# Patient Record
Sex: MALE | Race: BLACK OR AFRICAN AMERICAN | NOT HISPANIC OR LATINO
[De-identification: names, ages, dates, MRNs, and addresses within clinical notes are randomized per-mention and may not be internally consistent; named-entity substitution may affect disease eponyms.]

---

## 2021-01-04 ENCOUNTER — TRANSCRIPTION ENCOUNTER (OUTPATIENT)
Age: 46
End: 2021-01-04

## 2022-11-01 ENCOUNTER — APPOINTMENT (OUTPATIENT)
Dept: HUMAN REPRODUCTION | Facility: CLINIC | Age: 47
End: 2022-11-01

## 2023-08-24 PROBLEM — Z00.00 ENCOUNTER FOR PREVENTIVE HEALTH EXAMINATION: Status: ACTIVE | Noted: 2023-08-24

## 2023-08-28 ENCOUNTER — APPOINTMENT (OUTPATIENT)
Dept: ORTHOPEDIC SURGERY | Facility: CLINIC | Age: 48
End: 2023-08-28
Payer: MEDICAID

## 2023-08-28 VITALS — BODY MASS INDEX: 34.22 KG/M2 | WEIGHT: 239 LBS | HEIGHT: 70 IN

## 2023-08-28 DIAGNOSIS — I10 ESSENTIAL (PRIMARY) HYPERTENSION: ICD-10-CM

## 2023-08-28 DIAGNOSIS — E78.00 PURE HYPERCHOLESTEROLEMIA, UNSPECIFIED: ICD-10-CM

## 2023-08-28 DIAGNOSIS — E11.9 TYPE 2 DIABETES MELLITUS W/OUT COMPLICATIONS: ICD-10-CM

## 2023-08-28 DIAGNOSIS — M75.101 UNSPECIFIED ROTATOR CUFF TEAR OR RUPTURE OF RIGHT SHOULDER, NOT SPECIFIED AS TRAUMATIC: ICD-10-CM

## 2023-08-28 DIAGNOSIS — Z78.9 OTHER SPECIFIED HEALTH STATUS: ICD-10-CM

## 2023-08-28 PROCEDURE — 73010 X-RAY EXAM OF SHOULDER BLADE: CPT | Mod: RT

## 2023-08-28 PROCEDURE — 73030 X-RAY EXAM OF SHOULDER: CPT | Mod: RT

## 2023-08-28 PROCEDURE — 99204 OFFICE O/P NEW MOD 45 MIN: CPT

## 2023-08-28 RX ORDER — AMLODIPINE BESYLATE 5 MG/1
TABLET ORAL
Refills: 0 | Status: ACTIVE | COMMUNITY

## 2023-08-28 RX ORDER — ATORVASTATIN CALCIUM 80 MG/1
TABLET, FILM COATED ORAL
Refills: 0 | Status: ACTIVE | COMMUNITY

## 2023-08-28 RX ORDER — METFORMIN HYDROCHLORIDE 625 MG/1
TABLET ORAL
Refills: 0 | Status: ACTIVE | COMMUNITY

## 2023-08-28 NOTE — DISCUSSION/SUMMARY
[de-identified] : Given chronicity of symptoms along with multiple conservative treatments without significant improvement, MRI is indicated to further eval rtc pathology.  ----------------------------------------------------------------------------  All relevant imaging studies pertinent to today's visit, including x-rays, MRI's and/or other advanced imaging studies (CT/etc) were independently interpreted and reviewed with the patient as needed. Implications of the studies together with the patient's clinical picture were discussed to formulate a working diagnosis and management options were detailed.  The patient was advised of the diagnosis.  The natural history of the pathology was explained in full. All questions were answered.  The risks and benefits of conservative and interventional treatment alternatives were explained to the patient   ----------------------------------------------------------------------------  The patient was advised that an advanced diagnostic/imaging study (MRI/CT/etc) will be ordered to evaluate potential pathology in the affected area(s).  The patient was further advised to follow up in the office to review the results of the study and determine further management that may be indicated.

## 2023-08-28 NOTE — HISTORY OF PRESENT ILLNESS
[Gradual] : gradual [7] : 7 [0] : 0 [Sharp] : sharp [Occasional] : occasional [Ice] : ice [de-identified] : This is Mr. ALEKS MCARTHUR ([employer] for sports) 48 year old male who comes in today complaining of right shoulder pain for years. Notices significant worsening since April 2023. No specific injury. pain is lateral/ posterior. worse with overhead movement/ throwing. notices weakness in his throw.  Active in sports softball/ basketball/ bowling.  has done PT in the past with some help. has taken motrin prior to playing which has helped.  [] : no [de-identified] : softball

## 2023-08-28 NOTE — IMAGING
[Right] : right shoulder [There are no fractures, subluxations or dislocations. No significant abnormalities are seen] : There are no fractures, subluxations or dislocations. No significant abnormalities are seen [AC Joint Arthrosis] : AC Joint Arthrosis [Type 1 acromion] : Type 1 acromion [de-identified] :   ----------------------------------------------------------------------------   Right shoulder exam:   Skin: no significant pertinent finding Inspection: no obvious deformity, no obvious masses, no swelling, no effusion, no atrophy ROM:    FF: 180    ER: 70    IR: T12 Tenderness:    (neg) Anterior/Biceps:    (+) Posterior    (neg) Lateral    (neg) Trapezius    (neg) Scapula    (+) AC joint    (neg) Crepitus with ROM Stability:    (neg) Translation    (neg) Apprehension    (neg) Clicking Additional tests:    (+) Neer's    (+)Hawkin's    (neg) Wise's    (+) Speed    (+) Cross chest adduction Strength:    FF: 5/5 painful    ER: 5/5    IR: 5/5    Biceps: 5/5    Triceps: 5/5    Distal: 5/5 Neuro: In tact to light touch throughout Vascularity: Extremity warm and well perfused   [FreeTextEntry1] : mild djd

## 2023-09-06 ENCOUNTER — APPOINTMENT (OUTPATIENT)
Dept: MRI IMAGING | Facility: CLINIC | Age: 48
End: 2023-09-06
Payer: MEDICAID

## 2023-09-06 PROCEDURE — 73221 MRI JOINT UPR EXTREM W/O DYE: CPT | Mod: RT

## 2023-09-13 ENCOUNTER — APPOINTMENT (OUTPATIENT)
Dept: ORTHOPEDIC SURGERY | Facility: CLINIC | Age: 48
End: 2023-09-13
Payer: MEDICAID

## 2023-09-13 VITALS — BODY MASS INDEX: 34.22 KG/M2 | HEIGHT: 70 IN | WEIGHT: 239 LBS

## 2023-09-13 DIAGNOSIS — M19.011 PRIMARY OSTEOARTHRITIS, RIGHT SHOULDER: ICD-10-CM

## 2023-09-13 DIAGNOSIS — M67.921 UNSPECIFIED DISORDER OF SYNOVIUM AND TENDON, RIGHT UPPER ARM: ICD-10-CM

## 2023-09-13 DIAGNOSIS — S43.431A SUPERIOR GLENOID LABRUM LESION OF RIGHT SHOULDER, INITIAL ENCOUNTER: ICD-10-CM

## 2023-09-13 PROCEDURE — 99214 OFFICE O/P EST MOD 30 MIN: CPT

## 2023-10-12 ENCOUNTER — APPOINTMENT (OUTPATIENT)
Dept: ORTHOPEDIC SURGERY | Facility: CLINIC | Age: 48
End: 2023-10-12
Payer: MEDICAID

## 2023-10-12 VITALS — BODY MASS INDEX: 34.36 KG/M2 | HEIGHT: 70 IN | WEIGHT: 240 LBS

## 2023-10-12 DIAGNOSIS — M51.36 OTHER INTERVERTEBRAL DISC DEGENERATION, LUMBAR REGION: ICD-10-CM

## 2023-10-12 DIAGNOSIS — M54.16 RADICULOPATHY, LUMBAR REGION: ICD-10-CM

## 2023-10-12 PROCEDURE — 72170 X-RAY EXAM OF PELVIS: CPT

## 2023-10-12 PROCEDURE — 72110 X-RAY EXAM L-2 SPINE 4/>VWS: CPT

## 2023-10-12 PROCEDURE — 99214 OFFICE O/P EST MOD 30 MIN: CPT

## 2023-10-12 RX ORDER — GABAPENTIN 100 MG/1
100 CAPSULE ORAL
Qty: 60 | Refills: 2 | Status: ACTIVE | COMMUNITY
Start: 2023-10-12 | End: 1900-01-01

## 2024-01-12 ENCOUNTER — APPOINTMENT (OUTPATIENT)
Dept: ORTHOPEDIC SURGERY | Facility: CLINIC | Age: 49
End: 2024-01-12

## 2024-01-16 ENCOUNTER — RESULT REVIEW (OUTPATIENT)
Age: 49
End: 2024-01-16